# Patient Record
Sex: FEMALE | Race: WHITE | NOT HISPANIC OR LATINO | Employment: FULL TIME | URBAN - METROPOLITAN AREA
[De-identification: names, ages, dates, MRNs, and addresses within clinical notes are randomized per-mention and may not be internally consistent; named-entity substitution may affect disease eponyms.]

---

## 2017-06-02 ENCOUNTER — TELEPHONE (OUTPATIENT)
Dept: ORTHOPEDIC SURGERY | Facility: CLINIC | Age: 42
End: 2017-06-02

## 2017-06-02 NOTE — TELEPHONE ENCOUNTER
LMOM returning  message to make appt for right knee. Last visit with [facility] was 6/12/13 for right knee./[place]

## 2017-07-07 ENCOUNTER — OFFICE VISIT (OUTPATIENT)
Dept: ORTHOPEDIC SURGERY | Facility: CLINIC | Age: 42
End: 2017-07-07

## 2017-07-07 VITALS — BODY MASS INDEX: 36 KG/M2 | HEIGHT: 66 IN | TEMPERATURE: 98.8 F | WEIGHT: 224 LBS

## 2017-07-07 DIAGNOSIS — M25.561 CHRONIC PAIN OF RIGHT KNEE: ICD-10-CM

## 2017-07-07 DIAGNOSIS — G89.29 CHRONIC PAIN OF RIGHT KNEE: ICD-10-CM

## 2017-07-07 DIAGNOSIS — Z87.828 HISTORY OF MENISCAL TEAR: Primary | ICD-10-CM

## 2017-07-07 PROCEDURE — 99203 OFFICE O/P NEW LOW 30 MIN: CPT | Performed by: ORTHOPAEDIC SURGERY

## 2017-07-07 PROCEDURE — 20610 DRAIN/INJ JOINT/BURSA W/O US: CPT | Performed by: ORTHOPAEDIC SURGERY

## 2017-07-07 PROCEDURE — 73562 X-RAY EXAM OF KNEE 3: CPT | Performed by: ORTHOPAEDIC SURGERY

## 2017-07-07 RX ORDER — TRIAMCINOLONE ACETONIDE 1 MG/G
CREAM TOPICAL
COMMUNITY
Start: 2017-07-01

## 2017-07-07 RX ADMIN — LIDOCAINE HYDROCHLORIDE 2 ML: 10 INJECTION, SOLUTION INFILTRATION; PERINEURAL at 10:45

## 2017-07-07 RX ADMIN — BUPIVACAINE HYDROCHLORIDE 2 ML: 5 INJECTION, SOLUTION PERINEURAL at 10:45

## 2017-07-07 RX ADMIN — METHYLPREDNISOLONE ACETATE 160 MG: 80 INJECTION, SUSPENSION INTRA-ARTICULAR; INTRALESIONAL; INTRAMUSCULAR; SOFT TISSUE at 10:45

## 2017-07-08 RX ORDER — LIDOCAINE HYDROCHLORIDE 10 MG/ML
2 INJECTION, SOLUTION INFILTRATION; PERINEURAL
Status: COMPLETED | OUTPATIENT
Start: 2017-07-07 | End: 2017-07-07

## 2017-07-08 RX ORDER — METHYLPREDNISOLONE ACETATE 80 MG/ML
160 INJECTION, SUSPENSION INTRA-ARTICULAR; INTRALESIONAL; INTRAMUSCULAR; SOFT TISSUE
Status: COMPLETED | OUTPATIENT
Start: 2017-07-07 | End: 2017-07-07

## 2017-07-08 RX ORDER — BUPIVACAINE HYDROCHLORIDE 5 MG/ML
2 INJECTION, SOLUTION PERINEURAL
Status: COMPLETED | OUTPATIENT
Start: 2017-07-07 | End: 2017-07-07

## 2017-07-08 NOTE — PROGRESS NOTES
"  Patient: Ale Hand    YOB: 1975    Medical Record Number: [id number]    Chief Complaints:  Right knee pain and intermittent catching    History of Present Illness:     41 y.o. female patient who presents for evaluation of her right knee.  She reports a long history of intermittent problems with the knee.  It dates back to an injury that she sustained in college while playing softball.  She has been experiencing intermittent sharp stabbing pains into the medial and posterior aspect of the right knee along with intermittent catching of the knee.  At one point, she tells me that she was told that she had a partial ACL tear and meniscal tear.  She elected for nonsurgical management.  This has continued to bother her over the years.  She has had to give up certain exercises as result of her intermittent symptoms.  Rest does help somewhat.  She has had 2 episodes where the knee \"locked\" as she describes it.    Allergies: No Known Allergies    Home Medications:      Current Outpatient Prescriptions:   •  Naproxen Sodium (ALEVE PO), Take  by mouth., Disp: , Rfl:   •  triamcinolone (KENALOG) 0.1 % cream, , Disp: , Rfl:     History reviewed. No pertinent past medical history.    Past Surgical History:   Procedure Laterality Date   • KNEE SURGERY Left 1993, 2014    ACL   • LYMPH NODE BIOPSY  1988       Social History     Occupational History   • Not on file.     Social History Main Topics   • Smoking status: Never Smoker   • Smokeless tobacco: Not on file   • Alcohol use No   • Drug use: Defer   • Sexual activity: Defer      Social History     Social History Narrative   • No narrative on file       Family History   Problem Relation Age of Onset   • Heart disease Father    • Diabetes Father    • Cancer Maternal Aunt      uterine   • Deep vein thrombosis Maternal Grandmother    • Cancer Maternal Grandmother      lung   • Diabetes Maternal Grandmother    • Heart disease Paternal Grandfather        Review of " "Systems:      Constitutional: Denies fever, shaking or chills   Eyes: Denies change in visual acuity   HEENT: Denies nasal congestion or sore throat   Respiratory: Denies cough or shortness of breath   Cardiovascular: Denies chest pain or edema  Endocrine: Denies tremors, palpitations, intolerance of heat or cold, polyuria, polydipsia.  GI: Denies abdominal pain, nausea, vomiting, bloody stools or diarrhea  : Denies frequency, urgency, incontinence, retention, or nocturia.  Musculoskeletal: Denies numbness tingling or loss of motor function except as above  Integument: Denies rash, lesion or ulceration   Neurologic: Denies headache or focal weakness, deficits  Heme: Denies epistaxis, spontaneous or excessive bleeding, epistaxis, hematuria, melena, fatigue, enlarged or tender lymph nodes.      All other pertinent positives and negatives as noted above in HPI.    Physical Exam: 41 y.o. female    Vitals:    07/07/17 0855   Temp: 98.8 °F (37.1 °C)   Weight: 224 lb (102 kg)   Height: 66\" (167.6 cm)     General:  Patient is awake and alert.  Appears in no acute distress or discomfort.    Psych:  Affect and demeanor are appropriate.    Eyes:  Conjunctiva and sclera appear grossly normal.  Eyes track well and EOM seem to be intact.    Ears:  No gross abnormalities.  Hearing adequate for the exam.    Cardiovascular:  Regular rate and rhythm.    Lungs:  Good chest expansion.  Breathing unlabored.    Lymph:  No palpable masses or adenopathy in the affected extremity    Extremities:  The right knee is examined.  Skin is benign.  No gross abnormalities on inspection.  No palpable masses or adenopathy.  No effusion.  Moderate medial joint line tenderness.  Positive medial Jenny's test.  Full motion.  No instability--Lachman's seems to be stable.  Good strength with hip flexion, knee extension, ankle and toe plantarflexion and dorsiflexion.  Sensation is intact distally.  Brisk capillary refill in the toes with good skin " turgor.  Palpable pedal pulses.         Radiology:   Bilateral standing AP views, bilateral merchants views, and a lateral view of the right knee are ordered by myself and reviewed to evaluate the patient's complaint.  No comparison films are immediately available.  The x-rays show mild lateral compartment degenerative changes with osteophyte formation and subchondral sclerosis.  On the left, the postsurgical changes from her previous ACL reconstructions are evident along with mild degenerative changes.  No acute abnormalities, malalignment, or other significant findings.    Assessment/Plan:  Right knee medial meniscal tear    Her history and exam are both consistent with a diagnosis of a unstable meniscal tear.  We talked about options for this.  She wants to get through the summer and then may be interested in pursuing surgical options sometime towards the end of the year.  For now, she would like to get the knee injected.  The risks, benefits, and alternatives were discussed.  She consented to proceed with that.  Going forward, I will release her to follow-up as needed.    Large Joint Arthrocentesis  Date/Time: 7/7/2017 10:45 AM  Consent given by: patient  Site marked: site marked  Timeout: Immediately prior to procedure a time out was called to verify the correct patient, procedure, equipment, support staff and site/side marked as required   Supporting Documentation  Indications: pain and joint swelling   Procedure Details  Location: knee - R knee  Preparation: Patient was prepped and draped in the usual sterile fashion  Needle size: 25 G (21 G)  Approach: anterolateral  Medications administered: 2 mL lidocaine 1 %; 160 mg methylPREDNISolone acetate 80 MG/ML; 2 mL bupivacaine  Patient tolerance: patient tolerated the procedure well with no immediate complications            Jeffrey Hinton MD    07/07/2017    CC to PROSPER Puentes

## 2020-12-12 ENCOUNTER — HOSPITAL ENCOUNTER (OUTPATIENT)
Dept: OTHER | Facility: HOSPITAL | Age: 45
Discharge: HOME | End: 2020-12-12

## 2021-04-02 ENCOUNTER — HOSPITAL ENCOUNTER (OUTPATIENT)
Dept: OTHER | Facility: HOSPITAL | Age: 46
Discharge: HOME | End: 2021-04-02

## 2024-01-19 ENCOUNTER — TRANSCRIBE ORDERS (OUTPATIENT)
Dept: ADMINISTRATIVE | Facility: HOSPITAL | Age: 49
End: 2024-01-19
Payer: COMMERCIAL

## 2024-01-19 DIAGNOSIS — Z12.31 SCREENING MAMMOGRAM FOR HIGH-RISK PATIENT: Primary | ICD-10-CM

## 2024-02-16 ENCOUNTER — HOSPITAL ENCOUNTER (OUTPATIENT)
Dept: MAMMOGRAPHY | Facility: HOSPITAL | Age: 49
Discharge: HOME | End: 2024-02-16
Admitting: PHYSICIAN ASSISTANT
Payer: COMMERCIAL

## 2024-02-16 DIAGNOSIS — Z12.31 SCREENING MAMMOGRAM FOR HIGH-RISK PATIENT: ICD-10-CM

## 2024-02-16 PROCEDURE — 77063 BREAST TOMOSYNTHESIS BI: CPT

## 2024-02-16 PROCEDURE — 77067 SCR MAMMO BI INCL CAD: CPT
